# Patient Record
Sex: FEMALE | Race: BLACK OR AFRICAN AMERICAN | NOT HISPANIC OR LATINO | URBAN - METROPOLITAN AREA
[De-identification: names, ages, dates, MRNs, and addresses within clinical notes are randomized per-mention and may not be internally consistent; named-entity substitution may affect disease eponyms.]

---

## 2017-10-14 ENCOUNTER — EMERGENCY (EMERGENCY)
Facility: HOSPITAL | Age: 82
LOS: 1 days | Discharge: HOME | End: 2017-10-14
Attending: EMERGENCY MEDICINE
Payer: COMMERCIAL

## 2017-10-14 VITALS
OXYGEN SATURATION: 98 % | TEMPERATURE: 98 F | DIASTOLIC BLOOD PRESSURE: 69 MMHG | WEIGHT: 149.91 LBS | HEART RATE: 66 BPM | HEIGHT: 65 IN | RESPIRATION RATE: 18 BRPM | SYSTOLIC BLOOD PRESSURE: 108 MMHG

## 2017-10-14 VITALS
DIASTOLIC BLOOD PRESSURE: 78 MMHG | OXYGEN SATURATION: 98 % | RESPIRATION RATE: 16 BRPM | HEART RATE: 68 BPM | SYSTOLIC BLOOD PRESSURE: 135 MMHG | TEMPERATURE: 98 F

## 2017-10-14 LAB
ALBUMIN SERPL ELPH-MCNC: 4.3 G/DL — SIGNIFICANT CHANGE UP (ref 3.3–5.2)
ALP SERPL-CCNC: 77 U/L — SIGNIFICANT CHANGE UP (ref 40–120)
ALT FLD-CCNC: 13 U/L — SIGNIFICANT CHANGE UP
ANION GAP SERPL CALC-SCNC: 13 MMOL/L — SIGNIFICANT CHANGE UP (ref 5–17)
AST SERPL-CCNC: 23 U/L — SIGNIFICANT CHANGE UP
BASOPHILS # BLD AUTO: 0 K/UL — SIGNIFICANT CHANGE UP (ref 0–0.2)
BASOPHILS NFR BLD AUTO: 0.3 % — SIGNIFICANT CHANGE UP (ref 0–2)
BILIRUB SERPL-MCNC: 0.7 MG/DL — SIGNIFICANT CHANGE UP (ref 0.4–2)
BUN SERPL-MCNC: 19 MG/DL — SIGNIFICANT CHANGE UP (ref 8–20)
CALCIUM SERPL-MCNC: 9.8 MG/DL — SIGNIFICANT CHANGE UP (ref 8.6–10.2)
CHLORIDE SERPL-SCNC: 101 MMOL/L — SIGNIFICANT CHANGE UP (ref 98–107)
CO2 SERPL-SCNC: 24 MMOL/L — SIGNIFICANT CHANGE UP (ref 22–29)
CREAT SERPL-MCNC: 0.9 MG/DL — SIGNIFICANT CHANGE UP (ref 0.5–1.3)
EOSINOPHIL # BLD AUTO: 0.1 K/UL — SIGNIFICANT CHANGE UP (ref 0–0.5)
EOSINOPHIL NFR BLD AUTO: 1.5 % — SIGNIFICANT CHANGE UP (ref 0–6)
GLUCOSE SERPL-MCNC: 91 MG/DL — SIGNIFICANT CHANGE UP (ref 70–115)
HCT VFR BLD CALC: 39.9 % — SIGNIFICANT CHANGE UP (ref 37–47)
HGB BLD-MCNC: 13 G/DL — SIGNIFICANT CHANGE UP (ref 12–16)
LYMPHOCYTES # BLD AUTO: 1.6 K/UL — SIGNIFICANT CHANGE UP (ref 1–4.8)
LYMPHOCYTES # BLD AUTO: 27.8 % — SIGNIFICANT CHANGE UP (ref 20–55)
MCHC RBC-ENTMCNC: 27.1 PG — SIGNIFICANT CHANGE UP (ref 27–31)
MCHC RBC-ENTMCNC: 32.6 G/DL — SIGNIFICANT CHANGE UP (ref 32–36)
MCV RBC AUTO: 83.1 FL — SIGNIFICANT CHANGE UP (ref 81–99)
MONOCYTES # BLD AUTO: 0.4 K/UL — SIGNIFICANT CHANGE UP (ref 0–0.8)
MONOCYTES NFR BLD AUTO: 6.7 % — SIGNIFICANT CHANGE UP (ref 3–10)
NEUTROPHILS # BLD AUTO: 3.7 K/UL — SIGNIFICANT CHANGE UP (ref 1.8–8)
NEUTROPHILS NFR BLD AUTO: 63.5 % — SIGNIFICANT CHANGE UP (ref 37–73)
PLATELET # BLD AUTO: 216 K/UL — SIGNIFICANT CHANGE UP (ref 150–400)
POTASSIUM SERPL-MCNC: 4 MMOL/L — SIGNIFICANT CHANGE UP (ref 3.5–5.3)
POTASSIUM SERPL-SCNC: 4 MMOL/L — SIGNIFICANT CHANGE UP (ref 3.5–5.3)
PROT SERPL-MCNC: 8.2 G/DL — SIGNIFICANT CHANGE UP (ref 6.6–8.7)
RBC # BLD: 4.8 M/UL — SIGNIFICANT CHANGE UP (ref 4.4–5.2)
RBC # FLD: 15.3 % — SIGNIFICANT CHANGE UP (ref 11–15.6)
SODIUM SERPL-SCNC: 138 MMOL/L — SIGNIFICANT CHANGE UP (ref 135–145)
TROPONIN T SERPL-MCNC: <0.01 NG/ML — SIGNIFICANT CHANGE UP (ref 0–0.06)
TROPONIN T SERPL-MCNC: <0.01 NG/ML — SIGNIFICANT CHANGE UP (ref 0–0.06)
WBC # BLD: 5.9 K/UL — SIGNIFICANT CHANGE UP (ref 4.8–10.8)
WBC # FLD AUTO: 5.9 K/UL — SIGNIFICANT CHANGE UP (ref 4.8–10.8)

## 2017-10-14 PROCEDURE — 99285 EMERGENCY DEPT VISIT HI MDM: CPT

## 2017-10-14 PROCEDURE — 93005 ELECTROCARDIOGRAM TRACING: CPT

## 2017-10-14 PROCEDURE — 80053 COMPREHEN METABOLIC PANEL: CPT

## 2017-10-14 PROCEDURE — 85027 COMPLETE CBC AUTOMATED: CPT

## 2017-10-14 PROCEDURE — 99284 EMERGENCY DEPT VISIT MOD MDM: CPT | Mod: 25

## 2017-10-14 PROCEDURE — 71010: CPT | Mod: 26

## 2017-10-14 PROCEDURE — 84484 ASSAY OF TROPONIN QUANT: CPT

## 2017-10-14 PROCEDURE — 93010 ELECTROCARDIOGRAM REPORT: CPT

## 2017-10-14 PROCEDURE — 71045 X-RAY EXAM CHEST 1 VIEW: CPT

## 2017-10-14 PROCEDURE — 36415 COLL VENOUS BLD VENIPUNCTURE: CPT

## 2017-10-14 RX ORDER — SODIUM CHLORIDE 9 MG/ML
1000 INJECTION INTRAMUSCULAR; INTRAVENOUS; SUBCUTANEOUS ONCE
Qty: 0 | Refills: 0 | Status: COMPLETED | OUTPATIENT
Start: 2017-10-14 | End: 2017-10-14

## 2017-10-14 RX ORDER — SODIUM CHLORIDE 9 MG/ML
3 INJECTION INTRAMUSCULAR; INTRAVENOUS; SUBCUTANEOUS ONCE
Qty: 0 | Refills: 0 | Status: COMPLETED | OUTPATIENT
Start: 2017-10-14 | End: 2017-10-14

## 2017-10-14 RX ADMIN — SODIUM CHLORIDE 1000 MILLILITER(S): 9 INJECTION INTRAMUSCULAR; INTRAVENOUS; SUBCUTANEOUS at 17:46

## 2017-10-14 RX ADMIN — SODIUM CHLORIDE 3 MILLILITER(S): 9 INJECTION INTRAMUSCULAR; INTRAVENOUS; SUBCUTANEOUS at 17:21

## 2017-10-14 NOTE — ED PROVIDER NOTE - PROGRESS NOTE DETAILS
EKG sent to Dr. Rowe - states ekg changes likely due to RBBB - so medical management patient remains comfortable while in ED; trop negative x 2  discussed importance of follow up with pmd  will discharge  patient and family agreeable with plan

## 2017-10-14 NOTE — ED ADULT NURSE NOTE - OBJECTIVE STATEMENT
85y/o female c/o "weird/fuzzy" feeling. Pt states to have "weird indescribable feeling" Pt sinus rhyth on monitor, VSS, denies chest pain, SOB, numbness or tingling, +ROM to all extremities. Pt states she was with family at an event and felt "strange" put her head down on the table, denies LOC. will continue to monitor

## 2017-10-14 NOTE — ED PROVIDER NOTE - OBJECTIVE STATEMENT
85 yo female with hx htn, hyperlipidemia, dementia brought to ED by family for 'not feeling well.' Patient was at an event at [employer], and started to feel weak, tired, and was c/o chest burning/sob. Patient was noted to be tachypneic by family. denies fever or chills. denies abdominal pain, vomiting or diarrhea. patient states it was hot in the room. Patient states feel somewhat better, but still doesn't feel right

## 2022-12-28 ENCOUNTER — INPATIENT (INPATIENT)
Facility: HOSPITAL | Age: 87
LOS: 7 days | Discharge: HOSPICE | End: 2023-01-05
Attending: INTERNAL MEDICINE | Admitting: INTERNAL MEDICINE
Payer: COMMERCIAL

## 2022-12-28 VITALS
SYSTOLIC BLOOD PRESSURE: 150 MMHG | RESPIRATION RATE: 22 BRPM | OXYGEN SATURATION: 96 % | HEIGHT: 63 IN | WEIGHT: 139.99 LBS | HEART RATE: 140 BPM | DIASTOLIC BLOOD PRESSURE: 90 MMHG | TEMPERATURE: 99 F

## 2022-12-28 PROBLEM — F03.90 UNSPECIFIED DEMENTIA WITHOUT BEHAVIORAL DISTURBANCE: Chronic | Status: ACTIVE | Noted: 2017-10-14

## 2022-12-28 PROBLEM — I10 ESSENTIAL (PRIMARY) HYPERTENSION: Chronic | Status: ACTIVE | Noted: 2017-10-14

## 2022-12-28 PROBLEM — E78.5 HYPERLIPIDEMIA, UNSPECIFIED: Chronic | Status: ACTIVE | Noted: 2017-10-14

## 2022-12-28 LAB
ALBUMIN SERPL ELPH-MCNC: 3.3 G/DL — SIGNIFICANT CHANGE UP (ref 3.3–5)
ALP SERPL-CCNC: 87 U/L — SIGNIFICANT CHANGE UP (ref 40–120)
ALT FLD-CCNC: 41 U/L — SIGNIFICANT CHANGE UP (ref 12–78)
ANION GAP SERPL CALC-SCNC: 6 MMOL/L — SIGNIFICANT CHANGE UP (ref 5–17)
APPEARANCE UR: ABNORMAL
AST SERPL-CCNC: 79 U/L — HIGH (ref 15–37)
BACTERIA # UR AUTO: ABNORMAL
BASOPHILS # BLD AUTO: 0.02 K/UL — SIGNIFICANT CHANGE UP (ref 0–0.2)
BASOPHILS NFR BLD AUTO: 0.4 % — SIGNIFICANT CHANGE UP (ref 0–2)
BILIRUB SERPL-MCNC: 0.6 MG/DL — SIGNIFICANT CHANGE UP (ref 0.2–1.2)
BILIRUB UR-MCNC: NEGATIVE — SIGNIFICANT CHANGE UP
BUN SERPL-MCNC: 19 MG/DL — SIGNIFICANT CHANGE UP (ref 7–23)
CALCIUM SERPL-MCNC: 9.4 MG/DL — SIGNIFICANT CHANGE UP (ref 8.5–10.1)
CHLORIDE SERPL-SCNC: 107 MMOL/L — SIGNIFICANT CHANGE UP (ref 96–108)
CO2 SERPL-SCNC: 27 MMOL/L — SIGNIFICANT CHANGE UP (ref 22–31)
COLOR SPEC: YELLOW — SIGNIFICANT CHANGE UP
COMMENT - URINE: SIGNIFICANT CHANGE UP
CREAT SERPL-MCNC: 0.9 MG/DL — SIGNIFICANT CHANGE UP (ref 0.5–1.3)
DIFF PNL FLD: ABNORMAL
EGFR: 60 ML/MIN/1.73M2 — SIGNIFICANT CHANGE UP
EOSINOPHIL # BLD AUTO: 0 K/UL — SIGNIFICANT CHANGE UP (ref 0–0.5)
EOSINOPHIL NFR BLD AUTO: 0 % — SIGNIFICANT CHANGE UP (ref 0–6)
EPI CELLS # UR: NEGATIVE — SIGNIFICANT CHANGE UP
FLUAV AG NPH QL: SIGNIFICANT CHANGE UP
FLUBV AG NPH QL: SIGNIFICANT CHANGE UP
GLUCOSE BLDC GLUCOMTR-MCNC: 93 MG/DL — SIGNIFICANT CHANGE UP (ref 70–99)
GLUCOSE SERPL-MCNC: 103 MG/DL — HIGH (ref 70–99)
GLUCOSE UR QL: NEGATIVE MG/DL — SIGNIFICANT CHANGE UP
HCT VFR BLD CALC: 41.1 % — SIGNIFICANT CHANGE UP (ref 34.5–45)
HGB BLD-MCNC: 12.6 G/DL — SIGNIFICANT CHANGE UP (ref 11.5–15.5)
IMM GRANULOCYTES NFR BLD AUTO: 0.4 % — SIGNIFICANT CHANGE UP (ref 0–0.9)
KETONES UR-MCNC: ABNORMAL
LEUKOCYTE ESTERASE UR-ACNC: NEGATIVE — SIGNIFICANT CHANGE UP
LYMPHOCYTES # BLD AUTO: 0.74 K/UL — LOW (ref 1–3.3)
LYMPHOCYTES # BLD AUTO: 14.5 % — SIGNIFICANT CHANGE UP (ref 13–44)
MAGNESIUM SERPL-MCNC: 2.2 MG/DL — SIGNIFICANT CHANGE UP (ref 1.6–2.6)
MCHC RBC-ENTMCNC: 26.1 PG — LOW (ref 27–34)
MCHC RBC-ENTMCNC: 30.7 G/DL — LOW (ref 32–36)
MCV RBC AUTO: 85.3 FL — SIGNIFICANT CHANGE UP (ref 80–100)
MONOCYTES # BLD AUTO: 0.42 K/UL — SIGNIFICANT CHANGE UP (ref 0–0.9)
MONOCYTES NFR BLD AUTO: 8.2 % — SIGNIFICANT CHANGE UP (ref 2–14)
NEUTROPHILS # BLD AUTO: 3.92 K/UL — SIGNIFICANT CHANGE UP (ref 1.8–7.4)
NEUTROPHILS NFR BLD AUTO: 76.5 % — SIGNIFICANT CHANGE UP (ref 43–77)
NITRITE UR-MCNC: NEGATIVE — SIGNIFICANT CHANGE UP
NRBC # BLD: 0 /100 WBCS — SIGNIFICANT CHANGE UP (ref 0–0)
PH UR: 5 — SIGNIFICANT CHANGE UP (ref 5–8)
PLATELET # BLD AUTO: 214 K/UL — SIGNIFICANT CHANGE UP (ref 150–400)
POTASSIUM SERPL-MCNC: 3.9 MMOL/L — SIGNIFICANT CHANGE UP (ref 3.5–5.3)
POTASSIUM SERPL-SCNC: 3.9 MMOL/L — SIGNIFICANT CHANGE UP (ref 3.5–5.3)
PROT SERPL-MCNC: 8 GM/DL — SIGNIFICANT CHANGE UP (ref 6–8.3)
PROT UR-MCNC: 100 MG/DL
RBC # BLD: 4.82 M/UL — SIGNIFICANT CHANGE UP (ref 3.8–5.2)
RBC # FLD: 16.8 % — HIGH (ref 10.3–14.5)
RBC CASTS # UR COMP ASSIST: SIGNIFICANT CHANGE UP /HPF (ref 0–4)
SARS-COV-2 RNA SPEC QL NAA+PROBE: DETECTED
SODIUM SERPL-SCNC: 140 MMOL/L — SIGNIFICANT CHANGE UP (ref 135–145)
SP GR SPEC: 1.02 — SIGNIFICANT CHANGE UP (ref 1.01–1.02)
UROBILINOGEN FLD QL: NEGATIVE MG/DL — SIGNIFICANT CHANGE UP
WBC # BLD: 5.12 K/UL — SIGNIFICANT CHANGE UP (ref 3.8–10.5)
WBC # FLD AUTO: 5.12 K/UL — SIGNIFICANT CHANGE UP (ref 3.8–10.5)
WBC UR QL: SIGNIFICANT CHANGE UP

## 2022-12-28 PROCEDURE — 93010 ELECTROCARDIOGRAM REPORT: CPT

## 2022-12-28 PROCEDURE — 73502 X-RAY EXAM HIP UNI 2-3 VIEWS: CPT | Mod: 26,RT

## 2022-12-28 PROCEDURE — 72125 CT NECK SPINE W/O DYE: CPT | Mod: 26,MA

## 2022-12-28 PROCEDURE — 73552 X-RAY EXAM OF FEMUR 2/>: CPT | Mod: 26,RT

## 2022-12-28 PROCEDURE — 99285 EMERGENCY DEPT VISIT HI MDM: CPT | Mod: CS

## 2022-12-28 PROCEDURE — 72192 CT PELVIS W/O DYE: CPT | Mod: 26,MA

## 2022-12-28 PROCEDURE — 70450 CT HEAD/BRAIN W/O DYE: CPT | Mod: 26,MA

## 2022-12-28 PROCEDURE — 99223 1ST HOSP IP/OBS HIGH 75: CPT

## 2022-12-28 PROCEDURE — 71045 X-RAY EXAM CHEST 1 VIEW: CPT | Mod: 26

## 2022-12-28 RX ORDER — ACETAMINOPHEN 500 MG
650 TABLET ORAL EVERY 6 HOURS
Refills: 0 | Status: DISCONTINUED | OUTPATIENT
Start: 2022-12-28 | End: 2023-01-05

## 2022-12-28 RX ORDER — SODIUM CHLORIDE 9 MG/ML
500 INJECTION INTRAMUSCULAR; INTRAVENOUS; SUBCUTANEOUS ONCE
Refills: 0 | Status: COMPLETED | OUTPATIENT
Start: 2022-12-28 | End: 2022-12-28

## 2022-12-28 RX ORDER — LANOLIN ALCOHOL/MO/W.PET/CERES
3 CREAM (GRAM) TOPICAL AT BEDTIME
Refills: 0 | Status: DISCONTINUED | OUTPATIENT
Start: 2022-12-28 | End: 2023-01-05

## 2022-12-28 RX ORDER — ENOXAPARIN SODIUM 100 MG/ML
40 INJECTION SUBCUTANEOUS EVERY 12 HOURS
Refills: 0 | Status: DISCONTINUED | OUTPATIENT
Start: 2022-12-28 | End: 2022-12-29

## 2022-12-28 RX ORDER — ACETAMINOPHEN 500 MG
650 TABLET ORAL ONCE
Refills: 0 | Status: COMPLETED | OUTPATIENT
Start: 2022-12-28 | End: 2022-12-28

## 2022-12-28 RX ORDER — ALBUTEROL 90 UG/1
2 AEROSOL, METERED ORAL EVERY 6 HOURS
Refills: 0 | Status: DISCONTINUED | OUTPATIENT
Start: 2022-12-28 | End: 2023-01-05

## 2022-12-28 RX ADMIN — SODIUM CHLORIDE 500 MILLILITER(S): 9 INJECTION INTRAMUSCULAR; INTRAVENOUS; SUBCUTANEOUS at 16:53

## 2022-12-28 RX ADMIN — Medication 100 MILLIGRAM(S): at 23:11

## 2022-12-28 RX ADMIN — Medication 650 MILLIGRAM(S): at 16:13

## 2022-12-28 RX ADMIN — SODIUM CHLORIDE 500 MILLILITER(S): 9 INJECTION INTRAMUSCULAR; INTRAVENOUS; SUBCUTANEOUS at 16:13

## 2022-12-28 RX ADMIN — Medication 650 MILLIGRAM(S): at 16:53

## 2022-12-28 NOTE — H&P ADULT - ASSESSMENT
#COVID-19  - Not hypoxic, will hold off on decadron for now  - F/u pulse ox q8h  - Lovenox 40mg BID  - Tessalon, albuterol, Tylenol PRN    #Falls  - Fall risk  - PT eval  - IVORY consult for possible nursing facility/[facility] placement    Code: Full  VTE: None  Diet: Lovenox 40mg BID   #COVID-19 Pneumonia #AMS  - Not hypoxic, will hold off on decadron for now, treat symptoms  - F/u pulse ox q8h  - Tessalon, albuterol, Tylenol PRN  - D-dimer neg. (age adjusted)  - Lovenox 40mg    #Delirium on dementia  - Continue home donepezil, memantine  - Underlying cause is likely COVID-19 as above    #Falls  - Fall risk  - PT eval  - SW consult for possible nursing facility/[facility] placement    #Hypothyroidism  - Continue home levothyroxine 50mcg  - F/u TSH    #HTN  - Continue home amlodipine    #HLD  - Continue home aspirin, statin    Code: Full  VTE: None  Diet: Lovenox 40mg

## 2022-12-28 NOTE — ED PROVIDER NOTE - OBJECTIVE STATEMENT
89 y/o F w/ PMH of HTN, HLD, Dementia presenting w/ weakness. Seen w/ daughter. Pt A&O x1 (person, baseline per daughter). Hx from daughter. Reports for the past week or so pt has been increasingly weak and fatigued. At baseline does not walk well. Has had a few falls including last week when she rolled out of bed. Pt has been complaining of R hip pain. Also w/ increasing cough for the past few days and sweating.

## 2022-12-28 NOTE — ED PROVIDER NOTE - CLINICAL SUMMARY MEDICAL DECISION MAKING FREE TEXT BOX
89 y/o F w/ PMH as above presenting w/ weakness, cough, R hip pain. pt overall well appearing, no acute distress. Given recent falls, will obtain imaging to eval for traumatic pathology. W/ cough, will obtain viral swab and CXR to eval for URI/PNA. Eval for metabolic vs. infectious cause of weakness. Triage , however EKG done shortly after w/ HR of 65. On exam HR regular. Unclear if truly 140. Will obtain rectal temp to eval for fever. Plan for labs, imaging, EKG, meds. Will reassess the need for additional interventions as clinically warranted.

## 2022-12-28 NOTE — ED ADULT NURSE NOTE - NS ED NOTE ABUSE SUSPICION NEGLECT YN
Transition Record Discharge Summary    Patient: Barbara Saez   MRN: [id number]  : 1986       Discharging Diagnosis: Active Problems:    * No active hospital problems. *      Justification for Multiple Antipsychotics at Discharge: (Select answer and if applicable type medications)  Not Applicable as patient is receiving zero or one antipsychotic medications at discharge    Was a Prescription for an FDA- approved tobacco cessation medication given to the patient at discharge?   No, not applicable. Patient has not used tobacco within the past 30 days prior to the day of hospital admission or the patient smokes 4 or less cigarettes daily.    Was a prescription for an FDA-approved alcohol or drug disorder medication was given to the patient at discharge?    No, not applicable since the patient does not have a diagnosis of alcohol or drug disorder and does not have unhealthy alcohol use    Refusal of Lipid Panel  Not applicable as patient had Lipid panel completed during stay or documentation of lipid panel results within the past year has been documented within this encounter   No

## 2022-12-28 NOTE — ED PROVIDER NOTE - PROGRESS NOTE DETAILS
Attending Reji: pt covid positive. CTs w/o acute findings. discussed w/ daughter. Given covid+ and fact that pt has been weak and unsteady, daughter reports she is unable to care for pt at this time and would like her admitted. Will discuss w/ hospitalist. Attending Reji: endorsed to hospitalist Dr. Looney

## 2022-12-28 NOTE — ED PROVIDER NOTE - PHYSICAL EXAMINATION
Gen: NAD, AOx1, able to make needs known, non-toxic  Head: NCAT  HEENT: EOMI, oral mucosa moist, normal conjunctiva  Lung: CTAB, no respiratory distress, no wheezes/rhonchi/rales B/L, speaking in full sentences  CV: RRR, no murmurs  Abd: non distended, soft, nontender, no guarding, no CVA tenderness  MSK: no visible deformities. Pain in R leg w/ manipulation of leg. pelvis stable. no midline spinal tenderness/step offs  Neuro: does not follow commands well (baseline per daughter)  Skin: Warm, well perfused  Psych: normal affect

## 2022-12-28 NOTE — ED ADULT TRIAGE NOTE - CHIEF COMPLAINT QUOTE
Pt biba  from home per daughter at bedside pt with generalized weakness x2 days difficulty ambulating , pain to the pelvic area, denies any witness fall

## 2022-12-28 NOTE — H&P ADULT - NSHPPHYSICALEXAM_GEN_ALL_CORE
T(C): 36.4 (12-29-22 @ 01:20), Max: 37.6 (12-28-22 @ 15:30)  HR: 60 (12-29-22 @ 01:20) (60 - 140)  BP: 158/74 (12-29-22 @ 01:20) (124/69 - 158/74)  RR: 18 (12-29-22 @ 01:20) (18 - 22)  SpO2: 98% (12-29-22 @ 01:20) (94% - 98%)    CONSTITUTIONAL: Well groomed, no apparent distress  EYES: PERRLA and symmetric, EOMI, No conjunctival or scleral injection, non-icteric  ENMT: Oral mucosa with moist membranes.  NECK: Supple, symmetric and without tracheal deviation   RESP: No respiratory distress, no use of accessory muscles; CTA b/l, no WRR, upper airway noises  CV: RRR, +S1S2, no MRG; no JVD; no peripheral edema  GI: Soft, NT, ND, no rebound.  LYMPH: No cervical LAD or tenderness; no axillary LAD or tenderness; no inguinal LAD or tenderness  MSK: No spinal tenderness, normal muscle strength/tone  SKIN: No rashes or ulcers noted  NEURO: CN II-XII intact; sensation intact in upper and lower extremities b/l to light touch   PSYCH: A+O x 1, appropriate mood

## 2022-12-28 NOTE — H&P ADULT - HISTORY OF PRESENT ILLNESS
Pt is a poor historian. Hx obtained via chart review. A+Ox1, person.    89 y/o F w/ PMHx of HTN, HLD, Hypothyroidism, Dementia presents due to cough and weakness. For the past week or so pt has been increasingly coughing, weak and fatigued. At baseline does not ambulate well. Has had a few falls including last week when she rolled out of bed. Pt herself reports no acute complaints. Denies fever, sob, chest pain, abdominal pain.

## 2022-12-28 NOTE — H&P ADULT - NSHPLABSRESULTS_GEN_ALL_CORE
12.6   5.12  )-----------( 214      ( 28 Dec 2022 13:35 )             41.1       12-28    140  |  107  |  19  ----------------------------<  103<H>  3.9   |  27  |  0.90    Ca    9.4      28 Dec 2022 14:50  Mg     2.2     12-28    TPro  8.0  /  Alb  3.3  /  TBili  0.6  /  DBili  x   /  AST  79<H>  /  ALT  41  /  AlkPhos  87  12-28

## 2022-12-28 NOTE — ED PROVIDER NOTE - CARE PLAN
Principal Discharge DX:	2019 novel coronavirus disease (COVID-19)  Secondary Diagnosis:	Weakness   1

## 2022-12-28 NOTE — ED ADULT NURSE NOTE - OBJECTIVE STATEMENT
Pt alert and oriented x 0, daughter Roby at the bedside providing history. States pt has had generalized weakness, coughing, sweats x 3 days. Has not had anything to eat or drink in the past 2 days. PMH HTN, Carotid stenosis, Dementia. In no distress at this time. Pt normally able to ambulate with walker but has not been able to stand up out of bed the past 3 days. Pt alert and oriented x 1, daughter Roby at the bedside providing history. States pt has had generalized weakness, coughing, sweats x 3 days. Has not had anything to eat or drink in the past 2 days. PMH HTN, Carotid stenosis, Dementia. In no distress at this time. Pt normally able to ambulate with walker but has not been able to stand up out of bed the past 3 days. Daughter states possible fall last week, has had right hip pain since then. Pt alert and oriented x 1, daughter Roby at the bedside providing history. Daughter states she is at baseline mental status. States pt has had generalized weakness, coughing, sweats x 3 days. Has not had anything to eat or drink in the past 2 days. PMH HTN, Carotid stenosis, Dementia. In no distress at this time. Pt normally able to ambulate with walker but has not been able to stand up out of bed the past 3 days. Daughter states possible fall last week, has had right hip pain since then. Pt alert and oriented x 1, daughter Roby at the bedside providing history. Daughter states she is at baseline mental status. States pt has had generalized weakness, coughing, sweats x 3 days. Has not had anything to eat or drink in the past 2 days. PMH HTN, Carotid stenosis, Dementia. In no distress at this time. Pt normally able to ambulate with walker but has not been able to stand up out of bed the past 3 days. Daughter states possible unwitnessed fall, pt has right hip pain with movement of the right leg x 1 week. Has had falls in the past. Pt alert and oriented x 1, daughter Roby at the bedside providing history. Daughter states she is at baseline mental status. States pt has had generalized weakness, coughing, sweats x 3 days. Denies N/V/D, SOB. Has not had anything to eat or drink in the past 2 days. PMH HTN, Carotid stenosis, Dementia. In no distress at this time. Pt normally able to ambulate with walker but has not been able to stand up out of bed the past 3 days. Daughter states possible unwitnessed fall, pt has right hip pain with movement of the right leg x 1 week. Has had falls in the past.

## 2022-12-29 DIAGNOSIS — U07.1 COVID-19: ICD-10-CM
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
## 2022-12-29 DIAGNOSIS — R41.0 DISORIENTATION, UNSPECIFIED: ICD-10-CM

## 2022-12-29 LAB
A1C WITH ESTIMATED AVERAGE GLUCOSE RESULT: 6 % — HIGH (ref 4–5.6)
ALBUMIN SERPL ELPH-MCNC: 3.3 G/DL — SIGNIFICANT CHANGE UP (ref 3.3–5)
ALP SERPL-CCNC: 89 U/L — SIGNIFICANT CHANGE UP (ref 40–120)
ALT FLD-CCNC: 45 U/L — SIGNIFICANT CHANGE UP (ref 12–78)
ANION GAP SERPL CALC-SCNC: 7 MMOL/L — SIGNIFICANT CHANGE UP (ref 5–17)
AST SERPL-CCNC: 85 U/L — HIGH (ref 15–37)
BILIRUB SERPL-MCNC: 0.6 MG/DL — SIGNIFICANT CHANGE UP (ref 0.2–1.2)
BUN SERPL-MCNC: 16 MG/DL — SIGNIFICANT CHANGE UP (ref 7–23)
CALCIUM SERPL-MCNC: 9.5 MG/DL — SIGNIFICANT CHANGE UP (ref 8.5–10.1)
CHLORIDE SERPL-SCNC: 107 MMOL/L — SIGNIFICANT CHANGE UP (ref 96–108)
CHOLEST SERPL-MCNC: 198 MG/DL — SIGNIFICANT CHANGE UP
CO2 SERPL-SCNC: 26 MMOL/L — SIGNIFICANT CHANGE UP (ref 22–31)
CREAT SERPL-MCNC: 0.67 MG/DL — SIGNIFICANT CHANGE UP (ref 0.5–1.3)
CULTURE RESULTS: SIGNIFICANT CHANGE UP
D DIMER BLD IA.RAPID-MCNC: 553 NG/ML DDU — HIGH
EGFR: 82 ML/MIN/1.73M2 — SIGNIFICANT CHANGE UP
ESTIMATED AVERAGE GLUCOSE: 126 MG/DL — HIGH (ref 68–114)
GLUCOSE SERPL-MCNC: 122 MG/DL — HIGH (ref 70–99)
HCT VFR BLD CALC: 41.4 % — SIGNIFICANT CHANGE UP (ref 34.5–45)
HDLC SERPL-MCNC: 97 MG/DL — SIGNIFICANT CHANGE UP
HGB BLD-MCNC: 12.9 G/DL — SIGNIFICANT CHANGE UP (ref 11.5–15.5)
LIPID PNL WITH DIRECT LDL SERPL: 88 MG/DL — SIGNIFICANT CHANGE UP
MCHC RBC-ENTMCNC: 26 PG — LOW (ref 27–34)
MCHC RBC-ENTMCNC: 31.2 G/DL — LOW (ref 32–36)
MCV RBC AUTO: 83.5 FL — SIGNIFICANT CHANGE UP (ref 80–100)
NON HDL CHOLESTEROL: 101 MG/DL — SIGNIFICANT CHANGE UP
NRBC # BLD: 0 /100 WBCS — SIGNIFICANT CHANGE UP (ref 0–0)
PLATELET # BLD AUTO: 216 K/UL — SIGNIFICANT CHANGE UP (ref 150–400)
POTASSIUM SERPL-MCNC: 3.8 MMOL/L — SIGNIFICANT CHANGE UP (ref 3.5–5.3)
POTASSIUM SERPL-SCNC: 3.8 MMOL/L — SIGNIFICANT CHANGE UP (ref 3.5–5.3)
PROT SERPL-MCNC: 8.1 GM/DL — SIGNIFICANT CHANGE UP (ref 6–8.3)
RBC # BLD: 4.96 M/UL — SIGNIFICANT CHANGE UP (ref 3.8–5.2)
RBC # FLD: 16.2 % — HIGH (ref 10.3–14.5)
SODIUM SERPL-SCNC: 140 MMOL/L — SIGNIFICANT CHANGE UP (ref 135–145)
SPECIMEN SOURCE: SIGNIFICANT CHANGE UP
TRIGL SERPL-MCNC: 67 MG/DL — SIGNIFICANT CHANGE UP
WBC # BLD: 6.56 K/UL — SIGNIFICANT CHANGE UP (ref 3.8–10.5)
WBC # FLD AUTO: 6.56 K/UL — SIGNIFICANT CHANGE UP (ref 3.8–10.5)

## 2022-12-29 PROCEDURE — 99232 SBSQ HOSP IP/OBS MODERATE 35: CPT

## 2022-12-29 RX ORDER — AMLODIPINE BESYLATE 2.5 MG/1
1 TABLET ORAL
Qty: 0 | Refills: 0 | DISCHARGE

## 2022-12-29 RX ORDER — MEMANTINE HYDROCHLORIDE 10 MG/1
1 TABLET ORAL
Qty: 0 | Refills: 0 | DISCHARGE

## 2022-12-29 RX ORDER — ATORVASTATIN CALCIUM 80 MG/1
20 TABLET, FILM COATED ORAL AT BEDTIME
Refills: 0 | Status: DISCONTINUED | OUTPATIENT
Start: 2022-12-29 | End: 2023-01-05

## 2022-12-29 RX ORDER — INFLUENZA VIRUS VACCINE 15; 15; 15; 15 UG/.5ML; UG/.5ML; UG/.5ML; UG/.5ML
0.7 SUSPENSION INTRAMUSCULAR ONCE
Refills: 0 | Status: DISCONTINUED | OUTPATIENT
Start: 2022-12-29 | End: 2022-12-29

## 2022-12-29 RX ORDER — ENOXAPARIN SODIUM 100 MG/ML
40 INJECTION SUBCUTANEOUS EVERY 24 HOURS
Refills: 0 | Status: DISCONTINUED | OUTPATIENT
Start: 2022-12-29 | End: 2023-01-05

## 2022-12-29 RX ORDER — DONEPEZIL HYDROCHLORIDE 10 MG/1
10 TABLET, FILM COATED ORAL AT BEDTIME
Refills: 0 | Status: DISCONTINUED | OUTPATIENT
Start: 2022-12-29 | End: 2023-01-05

## 2022-12-29 RX ORDER — MEMANTINE HYDROCHLORIDE 10 MG/1
10 TABLET ORAL
Refills: 0 | Status: DISCONTINUED | OUTPATIENT
Start: 2022-12-29 | End: 2023-01-05

## 2022-12-29 RX ORDER — LEVOTHYROXINE SODIUM 125 MCG
50 TABLET ORAL DAILY
Refills: 0 | Status: DISCONTINUED | OUTPATIENT
Start: 2022-12-29 | End: 2023-01-05

## 2022-12-29 RX ORDER — AMLODIPINE BESYLATE 2.5 MG/1
2.5 TABLET ORAL DAILY
Refills: 0 | Status: DISCONTINUED | OUTPATIENT
Start: 2022-12-29 | End: 2023-01-05

## 2022-12-29 RX ORDER — DONEPEZIL HYDROCHLORIDE 10 MG/1
1 TABLET, FILM COATED ORAL
Qty: 0 | Refills: 0 | DISCHARGE

## 2022-12-29 RX ORDER — ATORVASTATIN CALCIUM 80 MG/1
1 TABLET, FILM COATED ORAL
Qty: 0 | Refills: 0 | DISCHARGE

## 2022-12-29 RX ORDER — ASPIRIN/CALCIUM CARB/MAGNESIUM 324 MG
81 TABLET ORAL DAILY
Refills: 0 | Status: DISCONTINUED | OUTPATIENT
Start: 2022-12-29 | End: 2023-01-05

## 2022-12-29 RX ORDER — ASPIRIN/CALCIUM CARB/MAGNESIUM 324 MG
0 TABLET ORAL
Qty: 0 | Refills: 0 | DISCHARGE

## 2022-12-29 RX ADMIN — MEMANTINE HYDROCHLORIDE 10 MILLIGRAM(S): 10 TABLET ORAL at 06:14

## 2022-12-29 RX ADMIN — ENOXAPARIN SODIUM 40 MILLIGRAM(S): 100 INJECTION SUBCUTANEOUS at 06:14

## 2022-12-29 RX ADMIN — AMLODIPINE BESYLATE 2.5 MILLIGRAM(S): 2.5 TABLET ORAL at 06:14

## 2022-12-29 RX ADMIN — Medication 50 MICROGRAM(S): at 08:54

## 2022-12-29 RX ADMIN — MEMANTINE HYDROCHLORIDE 10 MILLIGRAM(S): 10 TABLET ORAL at 17:32

## 2022-12-29 RX ADMIN — Medication 100 MILLIGRAM(S): at 06:14

## 2022-12-29 RX ADMIN — Medication 81 MILLIGRAM(S): at 12:39

## 2022-12-29 RX ADMIN — Medication 100 MILLIGRAM(S): at 14:31

## 2022-12-29 NOTE — PHYSICAL THERAPY INITIAL EVALUATION ADULT - ADDITIONAL COMMENTS
pt lives with daughter Roby and her family. Pt ambulates with a rolling walker at home, limited distances. (+) hx of falls. steps to enter home.

## 2022-12-29 NOTE — PHYSICAL THERAPY INITIAL EVALUATION ADULT - FOLLOWS COMMANDS/ANSWERS QUESTIONS, REHAB EVAL
verbal cueing and manual guidance for carryover/25% of the time/unable to follow multi-step instructions/unable to answer questions

## 2022-12-29 NOTE — PATIENT PROFILE ADULT - FALL HARM RISK - HARM RISK INTERVENTIONS

## 2022-12-29 NOTE — ED ADULT NURSE REASSESSMENT NOTE - NS ED NURSE REASSESS COMMENT FT1
Pt left ED accompanied by Medical Staff for admission to 2E. Pt in no acute distress.
Pt A&OX1, resting in stretcher accompanied by daughter. Pt in stable condition, awaiting admission to Regular floor.

## 2022-12-29 NOTE — PHYSICAL THERAPY INITIAL EVALUATION ADULT - ACTIVE RANGE OF MOTION EXAMINATION, REHAB EVAL
R LE AROM limited secondary to pain/bilateral upper extremity Active ROM was WFL (within functional limits)/Left LE Active ROM was WFL (within functional limits)/deficits as listed below

## 2022-12-29 NOTE — PHYSICAL THERAPY INITIAL EVALUATION ADULT - GENERAL OBSERVATIONS, REHAB EVAL
pt encountered semi-reclined, alert, speech unintelligble, NAD. Pt difficulty following commands, carryover 25% of the time with manual guidance.

## 2022-12-29 NOTE — PHYSICAL THERAPY INITIAL EVALUATION ADULT - PERTINENT HX OF CURRENT PROBLEM, REHAB EVAL
89 y/o F w/ PMHx of HTN, HLD, Hypothyroidism, Dementia presents due to cough and weakness. For the past week or so pt has been increasingly coughing, weak and fatigued. At baseline does not ambulate well. Has had a few falls including last week when she rolled out of bed. Pt herself reports no acute complaints. Denies fever, sob, chest pain, abdominal pain.

## 2022-12-30 LAB — TSH SERPL-MCNC: 2.3 UIU/ML — SIGNIFICANT CHANGE UP (ref 0.36–3.74)

## 2022-12-30 PROCEDURE — 99232 SBSQ HOSP IP/OBS MODERATE 35: CPT

## 2022-12-30 RX ADMIN — ATORVASTATIN CALCIUM 20 MILLIGRAM(S): 80 TABLET, FILM COATED ORAL at 22:32

## 2022-12-30 RX ADMIN — DONEPEZIL HYDROCHLORIDE 10 MILLIGRAM(S): 10 TABLET, FILM COATED ORAL at 22:32

## 2022-12-30 RX ADMIN — Medication 650 MILLIGRAM(S): at 11:08

## 2022-12-30 RX ADMIN — Medication 100 MILLIGRAM(S): at 22:32

## 2022-12-30 RX ADMIN — Medication 100 MILLIGRAM(S): at 05:48

## 2022-12-30 RX ADMIN — MEMANTINE HYDROCHLORIDE 10 MILLIGRAM(S): 10 TABLET ORAL at 05:48

## 2022-12-30 RX ADMIN — Medication 50 MICROGRAM(S): at 05:48

## 2022-12-30 RX ADMIN — Medication 81 MILLIGRAM(S): at 11:08

## 2022-12-30 RX ADMIN — AMLODIPINE BESYLATE 2.5 MILLIGRAM(S): 2.5 TABLET ORAL at 05:47

## 2022-12-30 RX ADMIN — Medication 100 MILLIGRAM(S): at 13:18

## 2022-12-30 RX ADMIN — Medication 650 MILLIGRAM(S): at 12:05

## 2022-12-30 RX ADMIN — ENOXAPARIN SODIUM 40 MILLIGRAM(S): 100 INJECTION SUBCUTANEOUS at 05:47

## 2022-12-30 RX ADMIN — MEMANTINE HYDROCHLORIDE 10 MILLIGRAM(S): 10 TABLET ORAL at 17:21

## 2022-12-31 PROCEDURE — 99232 SBSQ HOSP IP/OBS MODERATE 35: CPT

## 2022-12-31 RX ADMIN — Medication 100 MILLIGRAM(S): at 05:28

## 2022-12-31 RX ADMIN — Medication 100 MILLIGRAM(S): at 21:41

## 2022-12-31 RX ADMIN — AMLODIPINE BESYLATE 2.5 MILLIGRAM(S): 2.5 TABLET ORAL at 05:28

## 2022-12-31 RX ADMIN — MEMANTINE HYDROCHLORIDE 10 MILLIGRAM(S): 10 TABLET ORAL at 05:28

## 2022-12-31 RX ADMIN — ENOXAPARIN SODIUM 40 MILLIGRAM(S): 100 INJECTION SUBCUTANEOUS at 05:28

## 2022-12-31 RX ADMIN — MEMANTINE HYDROCHLORIDE 10 MILLIGRAM(S): 10 TABLET ORAL at 17:29

## 2022-12-31 RX ADMIN — Medication 50 MICROGRAM(S): at 05:28

## 2022-12-31 RX ADMIN — Medication 3 MILLIGRAM(S): at 21:42

## 2022-12-31 RX ADMIN — Medication 81 MILLIGRAM(S): at 11:37

## 2022-12-31 RX ADMIN — DONEPEZIL HYDROCHLORIDE 10 MILLIGRAM(S): 10 TABLET, FILM COATED ORAL at 21:41

## 2022-12-31 RX ADMIN — Medication 100 MILLIGRAM(S): at 13:56

## 2022-12-31 RX ADMIN — ATORVASTATIN CALCIUM 20 MILLIGRAM(S): 80 TABLET, FILM COATED ORAL at 21:41

## 2023-01-01 PROCEDURE — 99232 SBSQ HOSP IP/OBS MODERATE 35: CPT

## 2023-01-01 RX ADMIN — ATORVASTATIN CALCIUM 20 MILLIGRAM(S): 80 TABLET, FILM COATED ORAL at 22:14

## 2023-01-01 RX ADMIN — Medication 50 MICROGRAM(S): at 05:51

## 2023-01-01 RX ADMIN — MEMANTINE HYDROCHLORIDE 10 MILLIGRAM(S): 10 TABLET ORAL at 05:51

## 2023-01-01 RX ADMIN — Medication 100 MILLIGRAM(S): at 05:51

## 2023-01-01 RX ADMIN — Medication 100 MILLIGRAM(S): at 15:10

## 2023-01-01 RX ADMIN — DONEPEZIL HYDROCHLORIDE 10 MILLIGRAM(S): 10 TABLET, FILM COATED ORAL at 22:14

## 2023-01-01 RX ADMIN — AMLODIPINE BESYLATE 2.5 MILLIGRAM(S): 2.5 TABLET ORAL at 05:51

## 2023-01-01 RX ADMIN — Medication 81 MILLIGRAM(S): at 11:17

## 2023-01-01 RX ADMIN — ENOXAPARIN SODIUM 40 MILLIGRAM(S): 100 INJECTION SUBCUTANEOUS at 05:51

## 2023-01-01 RX ADMIN — Medication 100 MILLIGRAM(S): at 22:14

## 2023-01-01 RX ADMIN — MEMANTINE HYDROCHLORIDE 10 MILLIGRAM(S): 10 TABLET ORAL at 17:01

## 2023-01-02 PROCEDURE — 99232 SBSQ HOSP IP/OBS MODERATE 35: CPT

## 2023-01-02 RX ADMIN — Medication 650 MILLIGRAM(S): at 23:49

## 2023-01-02 RX ADMIN — AMLODIPINE BESYLATE 2.5 MILLIGRAM(S): 2.5 TABLET ORAL at 05:53

## 2023-01-02 RX ADMIN — DONEPEZIL HYDROCHLORIDE 10 MILLIGRAM(S): 10 TABLET, FILM COATED ORAL at 21:40

## 2023-01-02 RX ADMIN — ENOXAPARIN SODIUM 40 MILLIGRAM(S): 100 INJECTION SUBCUTANEOUS at 05:53

## 2023-01-02 RX ADMIN — Medication 100 MILLIGRAM(S): at 05:53

## 2023-01-02 RX ADMIN — MEMANTINE HYDROCHLORIDE 10 MILLIGRAM(S): 10 TABLET ORAL at 05:53

## 2023-01-02 RX ADMIN — MEMANTINE HYDROCHLORIDE 10 MILLIGRAM(S): 10 TABLET ORAL at 18:42

## 2023-01-02 RX ADMIN — Medication 81 MILLIGRAM(S): at 11:14

## 2023-01-02 RX ADMIN — Medication 50 MICROGRAM(S): at 05:53

## 2023-01-02 RX ADMIN — Medication 100 MILLIGRAM(S): at 21:40

## 2023-01-02 RX ADMIN — ATORVASTATIN CALCIUM 20 MILLIGRAM(S): 80 TABLET, FILM COATED ORAL at 21:40

## 2023-01-02 RX ADMIN — Medication 100 MILLIGRAM(S): at 14:05

## 2023-01-03 PROCEDURE — 99232 SBSQ HOSP IP/OBS MODERATE 35: CPT

## 2023-01-03 RX ADMIN — Medication 100 MILLIGRAM(S): at 13:37

## 2023-01-03 RX ADMIN — DONEPEZIL HYDROCHLORIDE 10 MILLIGRAM(S): 10 TABLET, FILM COATED ORAL at 21:37

## 2023-01-03 RX ADMIN — Medication 50 MICROGRAM(S): at 05:44

## 2023-01-03 RX ADMIN — MEMANTINE HYDROCHLORIDE 10 MILLIGRAM(S): 10 TABLET ORAL at 17:07

## 2023-01-03 RX ADMIN — ATORVASTATIN CALCIUM 20 MILLIGRAM(S): 80 TABLET, FILM COATED ORAL at 21:37

## 2023-01-03 RX ADMIN — MEMANTINE HYDROCHLORIDE 10 MILLIGRAM(S): 10 TABLET ORAL at 05:44

## 2023-01-03 RX ADMIN — Medication 100 MILLIGRAM(S): at 21:37

## 2023-01-03 RX ADMIN — Medication 3 MILLIGRAM(S): at 21:37

## 2023-01-03 RX ADMIN — Medication 100 MILLIGRAM(S): at 05:45

## 2023-01-03 RX ADMIN — Medication 81 MILLIGRAM(S): at 11:13

## 2023-01-03 RX ADMIN — AMLODIPINE BESYLATE 2.5 MILLIGRAM(S): 2.5 TABLET ORAL at 05:44

## 2023-01-03 RX ADMIN — ENOXAPARIN SODIUM 40 MILLIGRAM(S): 100 INJECTION SUBCUTANEOUS at 05:45

## 2023-01-03 NOTE — PROGRESS NOTE ADULT - PROBLEM SELECTOR PROBLEM 3
Falls, initial encounter

## 2023-01-04 PROCEDURE — 99233 SBSQ HOSP IP/OBS HIGH 50: CPT

## 2023-01-04 RX ADMIN — Medication 100 MILLIGRAM(S): at 05:36

## 2023-01-04 RX ADMIN — AMLODIPINE BESYLATE 2.5 MILLIGRAM(S): 2.5 TABLET ORAL at 05:36

## 2023-01-04 RX ADMIN — Medication 100 MILLIGRAM(S): at 21:24

## 2023-01-04 RX ADMIN — ATORVASTATIN CALCIUM 20 MILLIGRAM(S): 80 TABLET, FILM COATED ORAL at 21:24

## 2023-01-04 RX ADMIN — ENOXAPARIN SODIUM 40 MILLIGRAM(S): 100 INJECTION SUBCUTANEOUS at 05:37

## 2023-01-04 RX ADMIN — Medication 100 MILLIGRAM(S): at 13:26

## 2023-01-04 RX ADMIN — Medication 3 MILLIGRAM(S): at 21:24

## 2023-01-04 RX ADMIN — Medication 50 MICROGRAM(S): at 05:37

## 2023-01-04 RX ADMIN — MEMANTINE HYDROCHLORIDE 10 MILLIGRAM(S): 10 TABLET ORAL at 05:37

## 2023-01-04 RX ADMIN — MEMANTINE HYDROCHLORIDE 10 MILLIGRAM(S): 10 TABLET ORAL at 17:45

## 2023-01-04 RX ADMIN — DONEPEZIL HYDROCHLORIDE 10 MILLIGRAM(S): 10 TABLET, FILM COATED ORAL at 21:24

## 2023-01-04 RX ADMIN — Medication 81 MILLIGRAM(S): at 11:41

## 2023-01-04 NOTE — PROGRESS NOTE ADULT - SUBJECTIVE AND OBJECTIVE BOX
CC: Patient is a 89y old  Female who presents with a chief complaint of COVID-19 Pneumonia (03 Jan 2023 12:15)      Patient seen and examined at bedside, No acute overnight events.  Patient ambulating, eating well, voiding and last Bowel movement was   Cardiac monitor reviewed;    ROS: Denies fever, nausea, vomiting, chest pain, SOB, abdominal pain, diarrhea and constipation    Vital Sign  Vital Signs Last 24 Hrs  T(C): 37 (04 Jan 2023 12:11), Max: 37 (04 Jan 2023 12:11)  T(F): 98.6 (04 Jan 2023 12:11), Max: 98.6 (04 Jan 2023 12:11)  HR: 80 (04 Jan 2023 12:11) (72 - 80)  BP: 115/72 (04 Jan 2023 12:11) (115/72 - 150/64)  BP(mean): --  RR: 18 (04 Jan 2023 12:11) (17 - 18)  SpO2: 96% (04 Jan 2023 12:11) (96% - 98%)    Parameters below as of 03 Jan 2023 16:25  Patient On (Oxygen Delivery Method): room air        Physical Exam:   Gen: NAD, Obese  HEENT: NCAT, EOMI, PERRLA, Pupils_  CVS: RRR, +S1/S2, no murmurs, rubs or gallops appreciated  Lungs: CTAB, no wheeze, rales, rhonchi  Abdomen: +BS, soft, ND, NT. no palpable flank tenderness or mass, no CVA tenderness  Ext: No cyanosis, edema or calf tenderness  Neuro: AAOx1, no focal deficits.    Labs:          01-03 @ 07:01  -  01-04 @ 07:00  --------------------------------------------------------  IN: 0 mL / OUT: 350 mL / NET: -350 mL        Radiology:  *Pull    Medications:  MEDICATIONS  (STANDING):  amLODIPine   Tablet 2.5 milliGRAM(s) Oral daily  aspirin  chewable 81 milliGRAM(s) Oral daily  atorvastatin 20 milliGRAM(s) Oral at bedtime  benzonatate 100 milliGRAM(s) Oral every 8 hours  donepezil 10 milliGRAM(s) Oral at bedtime  enoxaparin Injectable 40 milliGRAM(s) SubCutaneous every 24 hours  levothyroxine 50 MICROGram(s) Oral daily  memantine 10 milliGRAM(s) Oral two times a day    MEDICATIONS  (PRN):  acetaminophen     Tablet .. 650 milliGRAM(s) Oral every 6 hours PRN Temp greater or equal to 38C (100.4F), Mild Pain (1 - 3)  albuterol    90 MICROgram(s) HFA Inhaler 2 Puff(s) Inhalation every 6 hours PRN Shortness of Breath  melatonin 3 milliGRAM(s) Oral at bedtime PRN Insomnia  
Patient is a 89y old  Female who presents with a chief complaint of COVID-19 Pneumonia (31 Dec 2022 11:04)    INTERVAL HPI/OVERNIGHT EVENTS:    MEDICATIONS  (STANDING):  amLODIPine   Tablet 2.5 milliGRAM(s) Oral daily  aspirin  chewable 81 milliGRAM(s) Oral daily  atorvastatin 20 milliGRAM(s) Oral at bedtime  benzonatate 100 milliGRAM(s) Oral every 8 hours  donepezil 10 milliGRAM(s) Oral at bedtime  enoxaparin Injectable 40 milliGRAM(s) SubCutaneous every 24 hours  levothyroxine 50 MICROGram(s) Oral daily  memantine 10 milliGRAM(s) Oral two times a day    MEDICATIONS  (PRN):  acetaminophen     Tablet .. 650 milliGRAM(s) Oral every 6 hours PRN Temp greater or equal to 38C (100.4F), Mild Pain (1 - 3)  albuterol    90 MICROgram(s) HFA Inhaler 2 Puff(s) Inhalation every 6 hours PRN Shortness of Breath  melatonin 3 milliGRAM(s) Oral at bedtime PRN Insomnia    Allergies    No Known Allergies    Intolerances      REVIEW OF SYSTEMS:  All other systems reviewed and are negative    Vital Signs Last 24 Hrs  T(C): 36.6 (01 Jan 2023 05:12), Max: 37.1 (31 Dec 2022 12:22)  T(F): 97.9 (01 Jan 2023 05:12), Max: 98.8 (31 Dec 2022 12:22)  HR: 72 (01 Jan 2023 05:12) (66 - 78)  BP: 135/68 (01 Jan 2023 05:12) (128/77 - 135/68)  BP(mean): --  RR: 18 (01 Jan 2023 05:12) (17 - 18)  SpO2: 96% (01 Jan 2023 05:12) (96% - 98%)    Parameters below as of 01 Jan 2023 05:12  Patient On (Oxygen Delivery Method): room air      Daily     Daily   I&O's Summary    31 Dec 2022 07:01  -  01 Jan 2023 07:00  --------------------------------------------------------  IN: 390 mL / OUT: 700 mL / NET: -310 mL      CAPILLARY BLOOD GLUCOSE        PHYSICAL EXAM:  CONSTITUTIONAL: Well groomed, no apparent distress  EYES: PERRLA and symmetric, EOMI, No conjunctival or scleral injection, non-icteric  ENMT: Oral mucosa with moist membranes.  NECK: Supple, symmetric and without tracheal deviation   RESP: No respiratory distress, no use of accessory muscles; CTA b/l, no WRR, upper airway noises  CV: RRR, +S1S2, no MRG; no JVD; no peripheral edema  GI: Soft, NT, ND, no rebound.  LYMPH: No cervical LAD or tenderness; no axillary LAD or tenderness; no inguinal LAD or tenderness  MSK: No spinal tenderness, normal muscle strength/tone  SKIN: No rashes or ulcers noted  NEURO: CN II-XII intact; sensation intact in upper and lower extremities b/l to light touch   PSYCH: A+O x 1, appropriate mood  Labs                                DVT prophylaxis: > Lovenox 40mg SQ daily  > Heparin   > SCD's
Patient is a 89y old  Female who presents with a chief complaint of COVID-19 Pneumonia (01 Jan 2023 10:59)    INTERVAL HPI/OVERNIGHT EVENTS:    MEDICATIONS  (STANDING):  amLODIPine   Tablet 2.5 milliGRAM(s) Oral daily  aspirin  chewable 81 milliGRAM(s) Oral daily  atorvastatin 20 milliGRAM(s) Oral at bedtime  benzonatate 100 milliGRAM(s) Oral every 8 hours  donepezil 10 milliGRAM(s) Oral at bedtime  enoxaparin Injectable 40 milliGRAM(s) SubCutaneous every 24 hours  levothyroxine 50 MICROGram(s) Oral daily  memantine 10 milliGRAM(s) Oral two times a day    MEDICATIONS  (PRN):  acetaminophen     Tablet .. 650 milliGRAM(s) Oral every 6 hours PRN Temp greater or equal to 38C (100.4F), Mild Pain (1 - 3)  albuterol    90 MICROgram(s) HFA Inhaler 2 Puff(s) Inhalation every 6 hours PRN Shortness of Breath  melatonin 3 milliGRAM(s) Oral at bedtime PRN Insomnia    Allergies    No Known Allergies    Intolerances      REVIEW OF SYSTEMS:  All other systems reviewed and are negative    Vital Signs Last 24 Hrs  T(C): 36.6 (02 Jan 2023 06:28), Max: 36.8 (01 Jan 2023 17:16)  T(F): 97.8 (02 Jan 2023 06:28), Max: 98.2 (01 Jan 2023 17:16)  HR: 71 (02 Jan 2023 06:28) (63 - 71)  BP: 125/73 (02 Jan 2023 06:28) (125/73 - 145/74)  BP(mean): --  RR: 17 (02 Jan 2023 06:28) (17 - 18)  SpO2: 96% (02 Jan 2023 06:28) (95% - 98%)      Daily     Daily   I&O's Summary    01 Jan 2023 07:01  -  02 Jan 2023 07:00  --------------------------------------------------------  IN: 240 mL / OUT: 0 mL / NET: 240 mL      CAPILLARY BLOOD GLUCOSE        PHYSICAL EXAM:  CONSTITUTIONAL: Well groomed, no apparent distress  EYES: PERRLA and symmetric, EOMI, No conjunctival or scleral injection, non-icteric  ENMT: Oral mucosa with moist membranes.  NECK: Supple, symmetric and without tracheal deviation   RESP: No respiratory distress, no use of accessory muscles; CTA b/l, no WRR, upper airway noises  CV: RRR, +S1S2, no MRG; no JVD; no peripheral edema  GI: Soft, NT, ND, no rebound.  LYMPH: No cervical LAD or tenderness; no axillary LAD or tenderness; no inguinal LAD or tenderness  MSK: No spinal tenderness, normal muscle strength/tone  SKIN: No rashes or ulcers noted  NEURO: CN II-XII intact; sensation intact in upper and lower extremities b/l to light touch   PSYCH: A+O x 1, appropriate mood    Labs                                DVT prophylaxis: > Lovenox 40mg SQ daily  > Heparin   > SCD's
Patient is a 89y old  Female who presents with a chief complaint of COVID-19 Pneumonia (02 Jan 2023 09:49)    INTERVAL HPI/OVERNIGHT EVENTS: no events     MEDICATIONS  (STANDING):  amLODIPine   Tablet 2.5 milliGRAM(s) Oral daily  aspirin  chewable 81 milliGRAM(s) Oral daily  atorvastatin 20 milliGRAM(s) Oral at bedtime  benzonatate 100 milliGRAM(s) Oral every 8 hours  donepezil 10 milliGRAM(s) Oral at bedtime  enoxaparin Injectable 40 milliGRAM(s) SubCutaneous every 24 hours  levothyroxine 50 MICROGram(s) Oral daily  memantine 10 milliGRAM(s) Oral two times a day    MEDICATIONS  (PRN):  acetaminophen     Tablet .. 650 milliGRAM(s) Oral every 6 hours PRN Temp greater or equal to 38C (100.4F), Mild Pain (1 - 3)  albuterol    90 MICROgram(s) HFA Inhaler 2 Puff(s) Inhalation every 6 hours PRN Shortness of Breath  melatonin 3 milliGRAM(s) Oral at bedtime PRN Insomnia    Allergies    No Known Allergies    Intolerances      REVIEW OF SYSTEMS:  All other systems reviewed and are negative    Vital Signs Last 24 Hrs  T(C): 36.9 (03 Jan 2023 11:23), Max: 37 (02 Jan 2023 23:45)  T(F): 98.5 (03 Jan 2023 11:23), Max: 98.6 (02 Jan 2023 23:45)  HR: 65 (03 Jan 2023 11:23) (65 - 86)  BP: 124/76 (03 Jan 2023 11:23) (121/70 - 149/75)  BP(mean): --  RR: 16 (03 Jan 2023 11:23) (16 - 17)  SpO2: 97% (03 Jan 2023 11:23) (95% - 97%)    Parameters below as of 02 Jan 2023 18:30  Patient On (Oxygen Delivery Method): room air      Daily     Daily   I&O's Summary    02 Jan 2023 07:01  -  03 Jan 2023 07:00  --------------------------------------------------------  IN: 0 mL / OUT: 350 mL / NET: -350 mL      CAPILLARY BLOOD GLUCOSE        PHYSICAL EXAM:  CONSTITUTIONAL: Well groomed, no apparent distress  EYES: PERRLA and symmetric, EOMI, No conjunctival or scleral injection, non-icteric  ENMT: Oral mucosa with moist membranes.  NECK: Supple, symmetric and without tracheal deviation   RESP: No respiratory distress, no use of accessory muscles; CTA b/l, no WRR, upper airway noises  CV: RRR, +S1S2, no MRG; no JVD; no peripheral edema  GI: Soft, NT, ND, no rebound.  LYMPH: No cervical LAD or tenderness; no axillary LAD or tenderness; no inguinal LAD or tenderness  MSK: No spinal tenderness, normal muscle strength/tone  SKIN: No rashes or ulcers noted  NEURO: CN II-XII intact; sensation intact in upper and lower extremities b/l to light touch   PSYCH: A+O x 1, appropriate mood      Labs                                DVT prophylaxis: > Lovenox 40mg SQ daily  > Heparin   > SCD's
Patient is a 89y old  Female who presents with a chief complaint of COVID-19 Pneumonia (28 Dec 2022 21:37)    INTERVAL HPI/OVERNIGHT EVENTS:    MEDICATIONS  (STANDING):  amLODIPine   Tablet 2.5 milliGRAM(s) Oral daily  aspirin  chewable 81 milliGRAM(s) Oral daily  atorvastatin 20 milliGRAM(s) Oral at bedtime  benzonatate 100 milliGRAM(s) Oral every 8 hours  donepezil 10 milliGRAM(s) Oral at bedtime  enoxaparin Injectable 40 milliGRAM(s) SubCutaneous every 24 hours  levothyroxine 50 MICROGram(s) Oral daily  memantine 10 milliGRAM(s) Oral two times a day    MEDICATIONS  (PRN):  acetaminophen     Tablet .. 650 milliGRAM(s) Oral every 6 hours PRN Temp greater or equal to 38C (100.4F), Mild Pain (1 - 3)  albuterol    90 MICROgram(s) HFA Inhaler 2 Puff(s) Inhalation every 6 hours PRN Shortness of Breath  melatonin 3 milliGRAM(s) Oral at bedtime PRN Insomnia    Allergies    No Known Allergies    Intolerances      REVIEW OF SYSTEMS:  All other systems reviewed and are negative    Vital Signs Last 24 Hrs  T(C): 36.5 (29 Dec 2022 06:35), Max: 37.6 (28 Dec 2022 15:30)  T(F): 97.7 (29 Dec 2022 06:35), Max: 99.6 (28 Dec 2022 15:30)  HR: 67 (29 Dec 2022 06:35) (60 - 140)  BP: 169/78 (29 Dec 2022 06:35) (124/69 - 169/78)  BP(mean): --  RR: 18 (29 Dec 2022 06:35) (18 - 22)  SpO2: 97% (29 Dec 2022 06:35) (94% - 98%)    Parameters below as of 29 Dec 2022 01:20  Patient On (Oxygen Delivery Method): room air      Daily Height in cm: 160.02 (29 Dec 2022 01:20)    Daily   I&O's Summary    28 Dec 2022 07:01  -  29 Dec 2022 07:00  --------------------------------------------------------  IN: 150 mL / OUT: 400 mL / NET: -250 mL      CAPILLARY BLOOD GLUCOSE      POCT Blood Glucose.: 93 mg/dL (28 Dec 2022 12:16)    PHYSICAL EXAM:  CONSTITUTIONAL: Well groomed, no apparent distress  EYES: PERRLA and symmetric, EOMI, No conjunctival or scleral injection, non-icteric  ENMT: Oral mucosa with moist membranes.  NECK: Supple, symmetric and without tracheal deviation   RESP: No respiratory distress, no use of accessory muscles; CTA b/l, no WRR, upper airway noises  CV: RRR, +S1S2, no MRG; no JVD; no peripheral edema  GI: Soft, NT, ND, no rebound.  LYMPH: No cervical LAD or tenderness; no axillary LAD or tenderness; no inguinal LAD or tenderness  MSK: No spinal tenderness, normal muscle strength/tone  SKIN: No rashes or ulcers noted  NEURO: CN II-XII intact; sensation intact in upper and lower extremities b/l to light touch   PSYCH: A+O x 1, appropriate mood  Labs                          12.9   6.56  )-----------( 216      ( 29 Dec 2022 09:10 )             41.4         140  |  107  |  16  ----------------------------<  122<H>  3.8   |  26  |  0.67    Ca    9.5      29 Dec 2022 09:10  Mg     2.2         TPro  8.1  /  Alb  3.3  /  TBili  0.6  /  DBili  x   /  AST  85<H>  /  ALT  45  /  AlkPhos  89            Urinalysis Basic - ( 28 Dec 2022 15:40 )    Color: Yellow / Appearance: Slightly Turbid / S.025 / pH: x  Gluc: x / Ketone: Small  / Bili: Negative / Urobili: Negative mg/dL   Blood: x / Protein: 100 mg/dL / Nitrite: Negative   Leuk Esterase: Negative / RBC: 0-2 /HPF / WBC 0-2   Sq Epi: x / Non Sq Epi: Negative / Bacteria: Moderate                  DVT prophylaxis: > Lovenox 40mg SQ daily  > Heparin   > SCD's
Patient is a 89y old  Female who presents with a chief complaint of COVID-19 Pneumonia (29 Dec 2022 12:04)    INTERVAL HPI/OVERNIGHT EVENTS: no events     MEDICATIONS  (STANDING):  amLODIPine   Tablet 2.5 milliGRAM(s) Oral daily  aspirin  chewable 81 milliGRAM(s) Oral daily  atorvastatin 20 milliGRAM(s) Oral at bedtime  benzonatate 100 milliGRAM(s) Oral every 8 hours  donepezil 10 milliGRAM(s) Oral at bedtime  enoxaparin Injectable 40 milliGRAM(s) SubCutaneous every 24 hours  levothyroxine 50 MICROGram(s) Oral daily  memantine 10 milliGRAM(s) Oral two times a day    MEDICATIONS  (PRN):  acetaminophen     Tablet .. 650 milliGRAM(s) Oral every 6 hours PRN Temp greater or equal to 38C (100.4F), Mild Pain (1 - 3)  albuterol    90 MICROgram(s) HFA Inhaler 2 Puff(s) Inhalation every 6 hours PRN Shortness of Breath  melatonin 3 milliGRAM(s) Oral at bedtime PRN Insomnia    Allergies    No Known Allergies    Intolerances      REVIEW OF SYSTEMS:  All other systems reviewed and are negative    Vital Signs Last 24 Hrs  T(C): 36.6 (30 Dec 2022 12:44), Max: 37 (29 Dec 2022 17:33)  T(F): 97.8 (30 Dec 2022 12:44), Max: 98.6 (29 Dec 2022 17:33)  HR: 60 (30 Dec 2022 12:44) (60 - 80)  BP: 133/60 (30 Dec 2022 12:44) (127/72 - 160/91)  BP(mean): --  RR: 18 (30 Dec 2022 12:44) (18 - 18)  SpO2: 97% (30 Dec 2022 12:44) (95% - 97%)      Daily     Daily   I&O's Summary    29 Dec 2022 07:01  -  30 Dec 2022 07:00  --------------------------------------------------------  IN: 0 mL / OUT: 400 mL / NET: -400 mL      CAPILLARY BLOOD GLUCOSE        PHYSICAL EXAM:  CONSTITUTIONAL: Well groomed, no apparent distress  EYES: PERRLA and symmetric, EOMI, No conjunctival or scleral injection, non-icteric  ENMT: Oral mucosa with moist membranes.  NECK: Supple, symmetric and without tracheal deviation   RESP: No respiratory distress, no use of accessory muscles; CTA b/l, no WRR, upper airway noises  CV: RRR, +S1S2, no MRG; no JVD; no peripheral edema  GI: Soft, NT, ND, no rebound.  LYMPH: No cervical LAD or tenderness; no axillary LAD or tenderness; no inguinal LAD or tenderness  MSK: No spinal tenderness, normal muscle strength/tone  SKIN: No rashes or ulcers noted  NEURO: CN II-XII intact; sensation intact in upper and lower extremities b/l to light touch   PSYCH: A+O x 1, appropriate mood    Labs                          12.9   6.56  )-----------( 216      ( 29 Dec 2022 09:10 )             41.4         140  |  107  |  16  ----------------------------<  122<H>  3.8   |  26  |  0.67    Ca    9.5      29 Dec 2022 09:10  Mg     2.2     -    TPro  8.1  /  Alb  3.3  /  TBili  0.6  /  DBili  x   /  AST  85<H>  /  ALT  45  /  AlkPhos  89            Urinalysis Basic - ( 28 Dec 2022 15:40 )    Color: Yellow / Appearance: Slightly Turbid / S.025 / pH: x  Gluc: x / Ketone: Small  / Bili: Negative / Urobili: Negative mg/dL   Blood: x / Protein: 100 mg/dL / Nitrite: Negative   Leuk Esterase: Negative / RBC: 0-2 /HPF / WBC 0-2   Sq Epi: x / Non Sq Epi: Negative / Bacteria: Moderate        Culture - Urine (collected 28 Dec 2022 15:40)  Source: Catheterized Catheterized  Final Report (29 Dec 2022 23:14):    <10,000 CFU/mL Normal Urogenital Naz                DVT prophylaxis: > Lovenox 40mg SQ daily  > Heparin   > SCD's
Patient is a 89y old  Female who presents with a chief complaint of COVID-19 Pneumonia (30 Dec 2022 14:00)    INTERVAL HPI/OVERNIGHT EVENTS: no events     MEDICATIONS  (STANDING):  amLODIPine   Tablet 2.5 milliGRAM(s) Oral daily  aspirin  chewable 81 milliGRAM(s) Oral daily  atorvastatin 20 milliGRAM(s) Oral at bedtime  benzonatate 100 milliGRAM(s) Oral every 8 hours  donepezil 10 milliGRAM(s) Oral at bedtime  enoxaparin Injectable 40 milliGRAM(s) SubCutaneous every 24 hours  levothyroxine 50 MICROGram(s) Oral daily  memantine 10 milliGRAM(s) Oral two times a day    MEDICATIONS  (PRN):  acetaminophen     Tablet .. 650 milliGRAM(s) Oral every 6 hours PRN Temp greater or equal to 38C (100.4F), Mild Pain (1 - 3)  albuterol    90 MICROgram(s) HFA Inhaler 2 Puff(s) Inhalation every 6 hours PRN Shortness of Breath  melatonin 3 milliGRAM(s) Oral at bedtime PRN Insomnia    Allergies    No Known Allergies    Intolerances      REVIEW OF SYSTEMS:  All other systems reviewed and are negative    Vital Signs Last 24 Hrs  T(C): 37.2 (31 Dec 2022 05:00), Max: 37.2 (31 Dec 2022 05:00)  T(F): 99 (31 Dec 2022 05:00), Max: 99 (31 Dec 2022 05:00)  HR: 74 (31 Dec 2022 05:00) (60 - 79)  BP: 104/63 (31 Dec 2022 05:00) (103/51 - 143/67)  BP(mean): --  RR: 18 (31 Dec 2022 05:00) (18 - 18)  SpO2: 97% (31 Dec 2022 05:00) (94% - 99%)    Parameters below as of 31 Dec 2022 05:00  Patient On (Oxygen Delivery Method): room air      Daily     Daily   I&O's Summary    CAPILLARY BLOOD GLUCOSE        PHYSICAL EXAM:  CONSTITUTIONAL: Well groomed, no apparent distress  EYES: PERRLA and symmetric, EOMI, No conjunctival or scleral injection, non-icteric  ENMT: Oral mucosa with moist membranes.  NECK: Supple, symmetric and without tracheal deviation   RESP: No respiratory distress, no use of accessory muscles; CTA b/l, no WRR, upper airway noises  CV: RRR, +S1S2, no MRG; no JVD; no peripheral edema  GI: Soft, NT, ND, no rebound.  LYMPH: No cervical LAD or tenderness; no axillary LAD or tenderness; no inguinal LAD or tenderness  MSK: No spinal tenderness, normal muscle strength/tone  SKIN: No rashes or ulcers noted  NEURO: CN II-XII intact; sensation intact in upper and lower extremities b/l to light touch   PSYCH: A+O x 1, appropriate mood    Labs                      Culture - Urine (collected 28 Dec 2022 15:40)  Source: Catheterized Catheterized  Final Report (29 Dec 2022 23:14):    <10,000 CFU/mL Normal Urogenital Naz                DVT prophylaxis: > Lovenox 40mg SQ daily  > Heparin   > SCD's

## 2023-01-04 NOTE — PROGRESS NOTE ADULT - ASSESSMENT
#COVID-19  #AMS  - Not hypoxic, will hold off on decadron for now, treat symptoms, cxr clear   - F/u pulse ox q8h  - Tessalon, albuterol, Tylenol PRN  - D-dimer neg. (age adjusted)  - Lovenox 40mg    #Delirium on dementia  - Continue home donepezil, memantine  - Underlying cause is likely COVID-19 as above    #Falls  - Fall risk  - PT eval  - SW consult for possible nursing facility/[facility] placement    #Hypothyroidism  - Continue home levothyroxine 50mcg  - F/u TSH    #HTN  - Continue home amlodipine    #HLD  - Continue home aspirin, statin    Code: Full  VTE: None  Diet: Lovenox 40mg   
#COVID-19  #AMS  - Not hypoxic, will hold off on decadron for now, treat symptoms, cxr clear   - F/u pulse ox q8h  - Tessalon, albuterol, Tylenol PRN  - D-dimer neg. (age adjusted)  - Lovenox 40mg    #Delirium on dementia  - Continue home donepezil, memantine  - Underlying cause is likely COVID-19 as above    #Falls  - Fall risk  - PT eval  - SW consult for possible nursing facility/[facility] placement    #Hypothyroidism  - Continue home levothyroxine 50mcg  - F/u TSH    #HTN  - Continue home amlodipine    #HLD  - Continue home aspirin, statin    Code: Full  VTE: None  Diet: Lovenox 40mg   
COVID-19 Pneumonia  - continue to monitor no evidence of hypoxia  - F/u pulse ox q8h  - Tessalon, albuterol, Tylenol PRN  - D-dimer neg. (age adjusted)  - Lovenox 40mg    #Dementia  - Continue home donepezil, memantine  - Delirium improved    #Falls  - Fall risk  - PT eval  - SW consult for possible nursing facility/[facility] placement    #Hypothyroidism  - Continue home levothyroxine 50mcg  - TSH wnl    #HTN  - Continue home amlodipine    #HLD  - Continue home aspirin, statin    Code: Full  Diet: Lovenox 40mg   
#COVID-19  #AMS  - Not hypoxic, will hold off on decadron for now, treat symptoms, cxr clear   - F/u pulse ox q8h  - Tessalon, albuterol, Tylenol PRN  - D-dimer neg. (age adjusted)  - Lovenox 40mg    #Delirium on dementia  - Continue home donepezil, memantine  - Underlying cause is likely COVID-19 as above    #Falls  - Fall risk  - PT eval  - SW consult for possible nursing facility/[facility] placement    #Hypothyroidism  - Continue home levothyroxine 50mcg  - F/u TSH    #HTN  - Continue home amlodipine    #HLD  - Continue home aspirin, statin    Code: Full  VTE: None  Diet: Lovenox 40mg   
#COVID-19  #AMS  - Not hypoxic, will hold off on decadron for now, treat symptoms, cxr clear   - F/u pulse ox q8h  - Tessalon, albuterol, Tylenol PRN  - D-dimer neg. (age adjusted)  - Lovenox 40mg    #Delirium on dementia  - Continue home donepezil, memantine  - Underlying cause is likely COVID-19 as above    #Falls  - Fall risk  - PT eval  - SW consult for possible nursing facility/[facility] placement    #Hypothyroidism  - Continue home levothyroxine 50mcg  -  TSH wnl    #HTN  - Continue home amlodipine    #HLD  - Continue home aspirin, statin    Code: Full  VTE: None  Diet: Lovenox 40mg   
  Awaiting [facility] placement       #COVID-19  #AMS  - Not hypoxic, will hold off on decadron for now, treat symptoms, cxr clear   - F/u pulse ox q8h  - Tessalon, albuterol, Tylenol PRN  - D-dimer neg. (age adjusted)  - Lovenox 40mg    #Delirium on dementia  - Continue home donepezil, memantine  - Underlying cause is likely COVID-19 as above    #Falls  - Fall risk  - PT eval  - SW consult for possible nursing facility/[facility] placement    #Hypothyroidism  - Continue home levothyroxine 50mcg  -  TSH wnl    #HTN  - Continue home amlodipine    #HLD  - Continue home aspirin, statin    Code: Full  VTE: None  Diet: Lovenox 40mg   
#COVID-19  #AMS  - Not hypoxic, will hold off on decadron for now, treat symptoms, cxr clear   - F/u pulse ox q8h  - Tessalon, albuterol, Tylenol PRN  - D-dimer neg. (age adjusted)  - Lovenox 40mg    #Delirium on dementia  - Continue home donepezil, memantine  - Underlying cause is likely COVID-19 as above    #Falls  - Fall risk  - PT eval  - SW consult for possible nursing facility/[facility] placement    #Hypothyroidism  - Continue home levothyroxine 50mcg  - F/u TSH    #HTN  - Continue home amlodipine    #HLD  - Continue home aspirin, statin    Code: Full  VTE: None  Diet: Lovenox 40mg

## 2023-01-05 VITALS
SYSTOLIC BLOOD PRESSURE: 108 MMHG | HEART RATE: 72 BPM | TEMPERATURE: 98 F | RESPIRATION RATE: 18 BRPM | DIASTOLIC BLOOD PRESSURE: 69 MMHG | OXYGEN SATURATION: 97 %

## 2023-01-05 LAB
ANION GAP SERPL CALC-SCNC: 5 MMOL/L — SIGNIFICANT CHANGE UP (ref 5–17)
BASOPHILS # BLD AUTO: 0.04 K/UL — SIGNIFICANT CHANGE UP (ref 0–0.2)
BASOPHILS NFR BLD AUTO: 0.5 % — SIGNIFICANT CHANGE UP (ref 0–2)
BUN SERPL-MCNC: 11 MG/DL — SIGNIFICANT CHANGE UP (ref 7–23)
CALCIUM SERPL-MCNC: 9.3 MG/DL — SIGNIFICANT CHANGE UP (ref 8.5–10.1)
CHLORIDE SERPL-SCNC: 104 MMOL/L — SIGNIFICANT CHANGE UP (ref 96–108)
CO2 SERPL-SCNC: 28 MMOL/L — SIGNIFICANT CHANGE UP (ref 22–31)
CREAT SERPL-MCNC: 0.7 MG/DL — SIGNIFICANT CHANGE UP (ref 0.5–1.3)
EGFR: 81 ML/MIN/1.73M2 — SIGNIFICANT CHANGE UP
EOSINOPHIL # BLD AUTO: 0.03 K/UL — SIGNIFICANT CHANGE UP (ref 0–0.5)
EOSINOPHIL NFR BLD AUTO: 0.4 % — SIGNIFICANT CHANGE UP (ref 0–6)
GLUCOSE SERPL-MCNC: 106 MG/DL — HIGH (ref 70–99)
HCT VFR BLD CALC: 41.3 % — SIGNIFICANT CHANGE UP (ref 34.5–45)
HGB BLD-MCNC: 12.9 G/DL — SIGNIFICANT CHANGE UP (ref 11.5–15.5)
IMM GRANULOCYTES NFR BLD AUTO: 1.4 % — HIGH (ref 0–0.9)
LYMPHOCYTES # BLD AUTO: 2.04 K/UL — SIGNIFICANT CHANGE UP (ref 1–3.3)
LYMPHOCYTES # BLD AUTO: 26.2 % — SIGNIFICANT CHANGE UP (ref 13–44)
MAGNESIUM SERPL-MCNC: 2.2 MG/DL — SIGNIFICANT CHANGE UP (ref 1.6–2.6)
MCHC RBC-ENTMCNC: 26.1 PG — LOW (ref 27–34)
MCHC RBC-ENTMCNC: 31.2 G/DL — LOW (ref 32–36)
MCV RBC AUTO: 83.4 FL — SIGNIFICANT CHANGE UP (ref 80–100)
MONOCYTES # BLD AUTO: 0.62 K/UL — SIGNIFICANT CHANGE UP (ref 0–0.9)
MONOCYTES NFR BLD AUTO: 8 % — SIGNIFICANT CHANGE UP (ref 2–14)
NEUTROPHILS # BLD AUTO: 4.95 K/UL — SIGNIFICANT CHANGE UP (ref 1.8–7.4)
NEUTROPHILS NFR BLD AUTO: 63.5 % — SIGNIFICANT CHANGE UP (ref 43–77)
NRBC # BLD: 0 /100 WBCS — SIGNIFICANT CHANGE UP (ref 0–0)
PHOSPHATE SERPL-MCNC: 2.7 MG/DL — SIGNIFICANT CHANGE UP (ref 2.5–4.5)
PLATELET # BLD AUTO: 300 K/UL — SIGNIFICANT CHANGE UP (ref 150–400)
POTASSIUM SERPL-MCNC: 3.7 MMOL/L — SIGNIFICANT CHANGE UP (ref 3.5–5.3)
POTASSIUM SERPL-SCNC: 3.7 MMOL/L — SIGNIFICANT CHANGE UP (ref 3.5–5.3)
RBC # BLD: 4.95 M/UL — SIGNIFICANT CHANGE UP (ref 3.8–5.2)
RBC # FLD: 15.7 % — HIGH (ref 10.3–14.5)
SODIUM SERPL-SCNC: 137 MMOL/L — SIGNIFICANT CHANGE UP (ref 135–145)
WBC # BLD: 7.79 K/UL — SIGNIFICANT CHANGE UP (ref 3.8–10.5)
WBC # FLD AUTO: 7.79 K/UL — SIGNIFICANT CHANGE UP (ref 3.8–10.5)

## 2023-01-05 PROCEDURE — 99239 HOSP IP/OBS DSCHRG MGMT >30: CPT

## 2023-01-05 RX ORDER — ACETAMINOPHEN 500 MG
2 TABLET ORAL
Qty: 0 | Refills: 0 | DISCHARGE
Start: 2023-01-05

## 2023-01-05 RX ORDER — LEVOTHYROXINE SODIUM 125 MCG
0 TABLET ORAL
Qty: 0 | Refills: 0 | DISCHARGE

## 2023-01-05 RX ORDER — ALBUTEROL 90 UG/1
2 AEROSOL, METERED ORAL
Qty: 0 | Refills: 0 | DISCHARGE
Start: 2023-01-05

## 2023-01-05 RX ORDER — LEVOTHYROXINE SODIUM 125 MCG
0.25 TABLET ORAL
Qty: 30 | Refills: 0
Start: 2023-01-05

## 2023-01-05 RX ORDER — LANOLIN ALCOHOL/MO/W.PET/CERES
1 CREAM (GRAM) TOPICAL
Qty: 0 | Refills: 0 | DISCHARGE
Start: 2023-01-05

## 2023-01-05 RX ADMIN — ENOXAPARIN SODIUM 40 MILLIGRAM(S): 100 INJECTION SUBCUTANEOUS at 05:16

## 2023-01-05 RX ADMIN — Medication 650 MILLIGRAM(S): at 10:40

## 2023-01-05 RX ADMIN — MEMANTINE HYDROCHLORIDE 10 MILLIGRAM(S): 10 TABLET ORAL at 05:16

## 2023-01-05 RX ADMIN — Medication 81 MILLIGRAM(S): at 12:51

## 2023-01-05 RX ADMIN — Medication 650 MILLIGRAM(S): at 09:34

## 2023-01-05 RX ADMIN — AMLODIPINE BESYLATE 2.5 MILLIGRAM(S): 2.5 TABLET ORAL at 05:16

## 2023-01-05 RX ADMIN — Medication 50 MICROGRAM(S): at 05:16

## 2023-01-05 RX ADMIN — Medication 100 MILLIGRAM(S): at 05:16

## 2023-01-05 RX ADMIN — Medication 100 MILLIGRAM(S): at 14:07

## 2023-01-05 NOTE — DISCHARGE NOTE PROVIDER - NSDCMRMEDTOKEN_GEN_ALL_CORE_FT
amLODIPine 2.5 mg oral tablet: 1 tab(s) orally once a day  atorvastatin 20 mg oral tablet: 1 tab(s) orally once a day  donepezil 10 mg oral tablet: 1 tab(s) orally once a day (at bedtime)  levothyroxine 25 mcg (0.025 mg) oral tablet: orally Saturday and Sunday  levothyroxine 50 mcg (0.05 mg) oral tablet: monday to friday  Low Dose ASA 81 mg oral tablet: orally once a day  memantine 10 mg oral tablet: 1 tab(s) orally 2 times a day

## 2023-01-05 NOTE — DISCHARGE NOTE PROVIDER - ATTENDING DISCHARGE PHYSICAL EXAMINATION:
.  VITAL SIGNS:  T(C): 36.6 (01-05-23 @ 11:37), Max: 36.9 (01-04-23 @ 23:40)  T(F): 97.8 (01-05-23 @ 11:37), Max: 98.4 (01-04-23 @ 23:40)  HR: 72 (01-05-23 @ 11:37) (72 - 89)  BP: 108/69 (01-05-23 @ 11:37) (107/60 - 135/71)  BP(mean): --  RR: 18 (01-05-23 @ 11:37) (16 - 18)  SpO2: 97% (01-05-23 @ 11:37) (95% - 97%)  Wt(kg): --    PHYSICAL EXAM:    Constitutional: WDWN resting comfortably in bed; NAD  Head: NC/AT  Eyes: PERRL, EOMI, clear conjunctiva  Respiratory: CTA B/L; no W/R/R, no retractions  Cardiac: +S1/S2; RRR;   Gastrointestinal: soft, NT/ND; no rebound or guarding; +BSx4  Genitourinary: normal external genitalia  Back: spine midline, no bony tenderness or step-offs; no CVAT B/L  Extremities: WWP, no clubbing or cyanosis; no peripheral edema  Musculoskeletal: NROM x4; no joint swelling, tenderness or erythema  Neurologic: not cooperating   Psychiatric: affect and characteristics of appearance, verbalizations, behaviors are appropriate

## 2023-01-05 NOTE — DISCHARGE NOTE NURSING/CASE MANAGEMENT/SOCIAL WORK - NSDCPEFALRISK_GEN_ALL_CORE
For information on Fall & Injury Prevention, visit: [email or web address]  [email or web address]  https://www.cdc.gov/steadi/patient.html

## 2023-01-05 NOTE — DISCHARGE NOTE PROVIDER - NSDCCPCAREPLAN_GEN_ALL_CORE_FT
PRINCIPAL DISCHARGE DIAGNOSIS  Diagnosis: 2019 novel coronavirus disease (COVID-19)  Assessment and Plan of Treatment:       SECONDARY DISCHARGE DIAGNOSES  Diagnosis: Weakness  Assessment and Plan of Treatment:

## 2023-01-05 NOTE — DISCHARGE NOTE NURSING/CASE MANAGEMENT/SOCIAL WORK - PATIENT PORTAL LINK FT
You can access the FollowMyHealth Patient Portal offered by [facility] by registering at the following website: [email or web address]. By joining [facility]’s FollowMyHealth portal, you will also be able to view your health information using other applications (apps) compatible with our system.

## 2023-01-05 NOTE — DISCHARGE NOTE PROVIDER - HOSPITAL COURSE
89 y/o F w/ PMHx of HTN, HLD, Hypothyroidism, Dementia presents due to cough and weakness. For the past week or so pt has been increasingly coughing, weak and fatigued. At baseline does not ambulate well. Has had a few falls including last week when she rolled out of bed. Pt herself reports no acute complaints. Denies fever, sob, chest pain, abdominal pain.   On admission , found to have COVID-19 Pneumonia and metabolic encephalopathy.   On admission patient was not hypoxic, did not require oxygen, received Lovenox 40mg for DVT px.  Cough was treated symptomatically treated with Tessalon pearls , electrolytes corrected , encephalopathy resolved , now patient mental status is back to baseline.  Weakness , patient was seen by PT :	pt lives with daughter Roby and her family. Pt ambulates with a rolling walker at home, limited distances. (+) hx of falls. steps to enter home.  Cognitive Status Examination:   · Orientation	unable to assess; responds to name  · Level of Consciousness	alert; confused  · Follows Commands and Answers Questions	25% of the time; unable to follow multi-step instructions; unable to answer questions; verbal cueing and manual guidance for carryover  · Personal Safety and Judgment	impaired    Patient is being DC home with hospice , for advanced dementia , resume home meds upon DC.

## 2023-01-11 DIAGNOSIS — E78.5 HYPERLIPIDEMIA, UNSPECIFIED: ICD-10-CM

## 2023-01-11 DIAGNOSIS — F03.918 UNSPECIFIED DEMENTIA, UNSPECIFIED SEVERITY, WITH OTHER BEHAVIORAL DISTURBANCE: ICD-10-CM

## 2023-01-11 DIAGNOSIS — R53.1 WEAKNESS: ICD-10-CM

## 2023-01-11 DIAGNOSIS — E03.9 HYPOTHYROIDISM, UNSPECIFIED: ICD-10-CM

## 2023-01-11 DIAGNOSIS — J12.82 PNEUMONIA DUE TO CORONAVIRUS DISEASE 2019: ICD-10-CM

## 2023-01-11 DIAGNOSIS — F05 DELIRIUM DUE TO KNOWN PHYSIOLOGICAL CONDITION: ICD-10-CM

## 2023-01-11 DIAGNOSIS — U07.1 COVID-19: ICD-10-CM

## 2023-01-11 DIAGNOSIS — R29.6 REPEATED FALLS: ICD-10-CM

## 2023-01-11 DIAGNOSIS — G93.41 METABOLIC ENCEPHALOPATHY: ICD-10-CM

## 2023-01-11 DIAGNOSIS — I10 ESSENTIAL (PRIMARY) HYPERTENSION: ICD-10-CM

## 2023-12-03 NOTE — PROGRESS NOTE ADULT - PROBLEM SELECTOR PROBLEM 2
EKG ongoing, patient placed on cardiac monitor   
Instructed for urine collection   
Patient need oxygen SPO2 89% room air   
Report completed, patient moved to the floor with stable condition awake alert oriented not in any signs of  distress noted, valuables shoes clothes  was given to family.   
Urine straight catrh done patient complainng of pain, no urine sample collected patient instructed for urine collection, remdesivir iv started   
Acute delirium